# Patient Record
Sex: FEMALE | Race: WHITE | NOT HISPANIC OR LATINO | Employment: FULL TIME | URBAN - METROPOLITAN AREA
[De-identification: names, ages, dates, MRNs, and addresses within clinical notes are randomized per-mention and may not be internally consistent; named-entity substitution may affect disease eponyms.]

---

## 2023-02-15 ENCOUNTER — PATIENT MESSAGE (OUTPATIENT)
Dept: PRIMARY CARE CLINIC | Facility: CLINIC | Age: 33
End: 2023-02-15

## 2024-05-03 ENCOUNTER — PATIENT MESSAGE (OUTPATIENT)
Dept: SURGERY | Facility: CLINIC | Age: 34
End: 2024-05-03
Payer: COMMERCIAL

## 2024-05-13 PROBLEM — Z80.3 FAMILY HISTORY OF BREAST CANCER: Status: ACTIVE | Noted: 2024-05-13

## 2024-05-13 PROBLEM — Z91.89 AT HIGH RISK FOR BREAST CANCER: Status: ACTIVE | Noted: 2024-05-13

## 2024-05-14 NOTE — PROGRESS NOTES
Breast Surgical Oncology  [place]  [facility]        PCP:  Lennie Del Rio MD  Date of Service: 2024    CHIEF COMPLAINT:   At high-risk for breast cancer    DIAGNOSIS:   Jocelynn Good is a 33 y.o. female who is kindly referred by Dr. Lennie Del Rio for evaluation of increased risk of breast cancer based on elevated score by a risk assessment model and family history of breast or ovarian cancer.     She began annual screening mammography at age 33 at which time a BIRADS 3 mass was identified. Her detailed family history of breast or ovarian cancer is as follows: maternal grandmother with breast cancer at unknown age,  at age 87; maternal aunt with breast cancer at age 60, current age is unknown; maternal grandmother with thyroid cancer at unknown age,  at age 87 . [employer] genetic testing showed a VUS in TIN gene.     Today, she denies breast concerns such as pain, masses, skin changes, nipple discharge, nipple retraction or lumps under the arm.     A right breast abnormality was identified on routine screening mammography.  Focused sonographic evaluation revealed a 14 x 7 x 11 mm benign-appearing mass 1 o'clock position 5 cm from the nipple. Adjacent to this there is an 8 x 5 x 8 mm benign-appearing mass. This was deemed BIRADS 3 and observation was recommended.     Her breast cancer risk factor profile is as follows: Menarche at 11, Menopause at N/A.  She is . Age at first live birth was 26. HRT: no and She did not breastfeed with pregnancy.    Other breast cancer risk factors include family hx on mother's side.     FAMILY HISTORY:     Family History   Problem Relation Name Age of Onset    Clotting disorder Father      Heart disease Neg Hx          PAST MEDICAL HISTORY:     Past Medical History:   Diagnosis Date    Hypertension     Missed         SURGICAL HISTORY:     Past Surgical History:   Procedure Laterality Date    DILATION AND CURETTAGE OF UTERUS      for missed      INTRAUTERINE DEVICE INSERTION  2017    mirena    REDUCTION OF BOTH BREASTS         SOCIAL HISTORY:     Social History     Tobacco Use    Smoking status: Never    Smokeless tobacco: Never        MEDICATIONS/ALLERGIES:     Current Outpatient Medications   Medication Instructions    acebutoloL (SECTRAL) 200 MG capsule No dose, route, or frequency recorded.    fluconazole (DIFLUCAN) 150 MG Tab Take one tab PO today and again in 48 hrs    fluconazole (DIFLUCAN) 150 mg, Oral, See admin instructions    FLUoxetine 20 MG capsule 1 capsule, Oral, Daily    metroNIDAZOLE (FLAGYL) 500 mg, Oral, Every 12 hours     Review of patient's allergies indicates:   Allergen Reactions    Ceclor [cefaclor]        REVIEW OF SYSTEMS:   I have reviewed 12 systems, including 2 points per system. Pertinent positives reported are: depressed mood, anxiety, changes in bowel habits    PHYSICAL EXAM:   General: The patient appears well and is in no acute distress.     Saturnino Bliss MA was present as a chaperone for the examination.   BREAST EXAM  No Asymmetry  Bilateral wise pattern incisions well healed  Right:  - Mass: No  - Skin change: No  - Nipple Discharge: No  - Nipple retraction: No  - Axillary LAD: No  Left:   - Mass: No  - Skin change: No  - Nipple Discharge: No  - Nipple retraction: No  - Axillary LAD: No    IMAGING:   All images and reports were personally reviewed.   Results for orders placed in visit on 24    US Breast Right Limited    Narrative  Result:  US Breast Right Limited    History:  Patient is 33 y.o. and is seen for diagnostic imaging.    Films Compared:  Compared to: 2024 Mammo Digital Screening Bilat w/ Abdi    Findings:    There i is a 14 x 7 x 11 mm benign-appearing mass 1 o'clock position 5 cm from the nipple.  Adjacent to this there is an 8 x 5 x 8 mm benign-appearing mass.    Six-month follow-up ultrasound recommended.    Impression  See above.    BI-RADS Category:  Overall: 3 - Probably  Benign      Recommendation:  Short interval follow-up is recommended in 6 months.      Results for orders placed in visit on 04/22/24    Mammo Digital Screening Bilat w/ Abdi    Narrative  Result:  Mammo Digital Screening Bilat w/ Abdi    History:  Patient is 33 y.o. and is seen for a screening mammogram.    Films Compared:  Prior images (if available) were compared.    Findings:  This procedure was performed using tomosynthesis. Computer-aided detection was utilized in the interpretation of this examination.  There is no evidence of suspicious masses, calcifications, or other abnormal findings on the left side.  Bilateral postoperative architectural distortion.  Two breast masses in the upper inner quadrant of the right breast measuring 1.5 cm and 1.0 cm.    Impression  Two right breast masses.  Recommend ultrasound correlation.    BI-RADS Category:  Overall: 0 - Incomplete: Needs Additional Imaging Evaluation      Recommendation:  Breast ultrasound is recommended.      PATHOLOGY:   none    ASSESSMENT:     1. At high risk for breast cancer    2. Family history of breast cancer          PLAN:   Jocelynn Good is a 33 y.o. female who presents for evaluation in the high risk program.  She was identified for the program by her OBGYN due to having a elevated score by a risk assessment model and family history of breast or ovarian cancer.  Her lifetime risk for the development of breast cancer by the Tyrer Cuzick v8 model is 24.2%.  Therefore, she meets criteria to be followed and screened as a high risk patient.      High risk screening will follow her observation period for her BIRADS 3 right breast mass.     We discussed that the designation of ACR BI-RADS 3 (Probably Benign) indicates that the finding has a 2% or less chance of malignancy through 2 year follow up. Typically, short interval imaging follow up is recommended and repeat diagnostic imaging has been arranged by the Radiology Department.     Image-guided core  needle biopsy of BI-RADS 3 lesions is occasionally recommended in certain instances. This is the case in patients age 50 or older or masses with certain features, including: calcification, nonparallel orientation of the mass, non-circumscribed margin, or posterior shadowing.     Jocelynn will return in 6 months for a clinical breast examination in conjunction with her repeat imaging.     Once this has completed her high risk screening plan will be:    We reviewed the NCCN guidelines for high risk women to be the following:   Twice annual clinical breast exam  Screening mammogram beginning 10 years earlier than the youngest affected family member  Consideration of supplemental annual imaging alternating with her mammogram on the 6 month interval. We discussed that the guidelines currently include breast MRI as the supplemental imaging option.   Adopting risk-reduction strategies and   Consideration of chemoprevention.      Her individualized plan is the following:  She will see me each October for a clinical breast exam and see her OBGYN each April for her second annual clinical breast exam.    She will have her mammogram each April.    She would like to proceed with bilateral breast MRI.  This will be done in October.    Regarding risk reduction, she is recommended to maintain a healthy weight (BMI 18-25), regular aerobic exercise (at least 150 minutes/week), balanced healthy diet (high in vegetables, fruits, and whole grains) and avoidance of red meats, processed foods, & refined sugars. , and limit alcohol consumption .   We discussed the findings of the NSABP Prevention One trial and the potential side effects of tamoxifen. We will discuss this further following observation of her BIRADS 3 mass    The patient has had genetic testing and based on the results does not warrant further genetic counseling.     Jocelynn Good has a normal breast exam today. We discussed the possible signs and symptoms of breast cancer as  lump, masses, new asymmetries, skin changes and nipple changes. She is encouraged to contact me if any new breast concerns arise.  She has been provided a handout that details today's discussion and her plan.     I spent a total of 45 minutes on this visit. This includes face to face time and non-face to face time preparing to see the patient (eg, review of tests), obtaining and/or reviewing separately obtained history, documenting clinical information in the electronic or other health record, independently interpreting results and communicating results to the patient/family/caregiver, or care coordinator.      Camelia Davidson MD

## 2024-05-16 ENCOUNTER — OFFICE VISIT (OUTPATIENT)
Dept: SURGERY | Facility: CLINIC | Age: 34
End: 2024-05-16
Payer: COMMERCIAL

## 2024-05-16 DIAGNOSIS — Z91.89 AT HIGH RISK FOR BREAST CANCER: Primary | ICD-10-CM

## 2024-05-16 DIAGNOSIS — N63.11 UNSPECIFIED LUMP IN THE RIGHT BREAST, UPPER OUTER QUADRANT: ICD-10-CM

## 2024-05-16 DIAGNOSIS — Z80.3 FAMILY HISTORY OF BREAST CANCER: ICD-10-CM

## 2024-05-16 DIAGNOSIS — Z91.89 AT INCREASED RISK FOR MALIGNANT NEOPLASM OF BREAST: ICD-10-CM

## 2024-05-16 PROCEDURE — 99204 OFFICE O/P NEW MOD 45 MIN: CPT | Mod: S$GLB,,, | Performed by: SURGERY

## 2024-05-16 PROCEDURE — 99999 PR PBB SHADOW E&M-EST. PATIENT-LVL I: CPT | Mod: PBBFAC,,, | Performed by: SURGERY

## 2024-08-15 ENCOUNTER — PATIENT MESSAGE (OUTPATIENT)
Dept: SURGERY | Facility: CLINIC | Age: 34
End: 2024-08-15
Payer: COMMERCIAL

## 2024-08-22 ENCOUNTER — TELEPHONE (OUTPATIENT)
Dept: SURGERY | Facility: CLINIC | Age: 34
End: 2024-08-22
Payer: COMMERCIAL

## 2024-08-22 NOTE — TELEPHONE ENCOUNTER
Left vm regarding 10/25/24 appointment w/ breast specialist. Informed that MD is no longer with our organization, and if preferred, can keep R breast us this day. Informed can be rescheduled to see one of our general surgeons at an [facility] facility. Asked for patient to either respond to [employer] msg or give the office a phone call at [phone]. Thank you.

## 2024-12-12 ENCOUNTER — PATIENT MESSAGE (OUTPATIENT)
Dept: RESEARCH | Facility: HOSPITAL | Age: 34
End: 2024-12-12
Payer: COMMERCIAL